# Patient Record
Sex: MALE | NOT HISPANIC OR LATINO | Employment: FULL TIME | URBAN - METROPOLITAN AREA
[De-identification: names, ages, dates, MRNs, and addresses within clinical notes are randomized per-mention and may not be internally consistent; named-entity substitution may affect disease eponyms.]

---

## 2021-01-20 ENCOUNTER — OFFICE VISIT (OUTPATIENT)
Dept: FAMILY MEDICINE CLINIC | Facility: CLINIC | Age: 31
End: 2021-01-20

## 2021-01-20 VITALS
SYSTOLIC BLOOD PRESSURE: 118 MMHG | RESPIRATION RATE: 16 BRPM | BODY MASS INDEX: 31.89 KG/M2 | HEIGHT: 67 IN | OXYGEN SATURATION: 97 % | WEIGHT: 203.2 LBS | DIASTOLIC BLOOD PRESSURE: 70 MMHG | HEART RATE: 72 BPM | TEMPERATURE: 98 F

## 2021-01-20 DIAGNOSIS — Z00.00 ANNUAL PHYSICAL EXAM: Primary | ICD-10-CM

## 2021-01-20 LAB
ALBUMIN SERPL-MCNC: 5 G/DL (ref 3.5–5.2)
ALBUMIN/GLOB SERPL: 2 G/DL
ALP SERPL-CCNC: 87 U/L (ref 39–117)
ALT SERPL W P-5'-P-CCNC: 47 U/L (ref 1–41)
ANION GAP SERPL CALCULATED.3IONS-SCNC: 11.1 MMOL/L (ref 5–15)
AST SERPL-CCNC: 22 U/L (ref 1–40)
BILIRUB SERPL-MCNC: 0.6 MG/DL (ref 0–1.2)
BUN SERPL-MCNC: 13 MG/DL (ref 6–20)
BUN/CREAT SERPL: 14.1 (ref 7–25)
CALCIUM SPEC-SCNC: 9.7 MG/DL (ref 8.6–10.5)
CHLORIDE SERPL-SCNC: 99 MMOL/L (ref 98–107)
CHOLEST SERPL-MCNC: 182 MG/DL (ref 0–200)
CO2 SERPL-SCNC: 27.9 MMOL/L (ref 22–29)
CREAT SERPL-MCNC: 0.92 MG/DL (ref 0.76–1.27)
GFR SERPL CREATININE-BSD FRML MDRD: 116 ML/MIN/1.73
GFR SERPL CREATININE-BSD FRML MDRD: 95 ML/MIN/1.73
GLOBULIN UR ELPH-MCNC: 2.5 GM/DL
GLUCOSE SERPL-MCNC: 71 MG/DL (ref 65–99)
HCV AB SER DONR QL: NORMAL
HDLC SERPL-MCNC: 37 MG/DL (ref 40–60)
LDLC SERPL CALC-MCNC: 115 MG/DL (ref 0–100)
LDLC/HDLC SERPL: 3.01 {RATIO}
POTASSIUM SERPL-SCNC: 4.3 MMOL/L (ref 3.5–5.2)
PROT SERPL-MCNC: 7.5 G/DL (ref 6–8.5)
SODIUM SERPL-SCNC: 138 MMOL/L (ref 136–145)
TRIGL SERPL-MCNC: 169 MG/DL (ref 0–150)
VLDLC SERPL-MCNC: 30 MG/DL (ref 5–40)

## 2021-01-20 PROCEDURE — 36415 COLL VENOUS BLD VENIPUNCTURE: CPT | Performed by: FAMILY MEDICINE

## 2021-01-20 PROCEDURE — 80053 COMPREHEN METABOLIC PANEL: CPT | Performed by: FAMILY MEDICINE

## 2021-01-20 PROCEDURE — 80061 LIPID PANEL: CPT | Performed by: FAMILY MEDICINE

## 2021-01-20 PROCEDURE — 86803 HEPATITIS C AB TEST: CPT | Performed by: FAMILY MEDICINE

## 2021-01-20 PROCEDURE — 99395 PREV VISIT EST AGE 18-39: CPT | Performed by: FAMILY MEDICINE

## 2021-01-20 NOTE — PROGRESS NOTES
New Patient Office Visit      Patient Name: Pato Carreon  : 1990   MRN: [id number]     Chief Complaint:    Chief Complaint   Patient presents with   • Establish Care   • Annual Exam       History of Present Illness: Pato Carreon is a 30 y.o. male who is here today to establish care.  Patient presents for annual exam today.  Patient defers a flu vaccine wants to check with his mother regarding his last tetanus shot.  Patient will call if he needs a tetanus shot.  Patient has some obesity and was counseled regarding exercise and diet recommendations.  Patient was offered screening labs today due to his obesity we will screen for hepatitis C as well.    Physical exam: Patient had normal heart and lung exam.  Patient's mood and affect were appropriate.  Patient had equal strength on bilateral hip flexion and 2+ knee reflexes bilaterally.    Subjective          Past Medical History: History reviewed. No pertinent past medical history.    Past Surgical History: History reviewed. No pertinent surgical history.    Family History:   Family History   Problem Relation Age of Onset   • No Known Problems Mother    • No Known Problems Father    • No Known Problems Sister    • No Known Problems Brother    • No Known Problems Brother    • No Known Problems Brother        Social History:   Social History     Socioeconomic History   • Marital status:      Spouse name: Not on file   • Number of children: Not on file   • Years of education: Not on file   • Highest education level: Not on file   Tobacco Use   • Smoking status: Never Smoker   • Smokeless tobacco: Never Used   Substance and Sexual Activity   • Alcohol use: No   • Drug use: Never   • Sexual activity: Defer       Medications:   No current outpatient medications on file.    Allergies:   No Known Allergies    Objective     Physical Exam:  Vital Signs:   Vitals:    21 1150   BP: 118/70   BP Location: Right arm   Patient Position: Sitting   Cuff  "Size: Adult   Pulse: 72   Resp: 16   Temp: 98 °F (36.7 °C)   TempSrc: Temporal   SpO2: 97%   Weight: 92.2 kg (203 lb 3.2 oz)   Height: 170.2 cm (67\")   PainSc: 0-No pain     Body mass index is 31.83 kg/m².       Assessment / Plan      Assessment/Plan:   Diagnoses and all orders for this visit:    1. Annual physical exam (Primary)  -     Lipid Panel  -     Hepatitis C Antibody  -     Comprehensive Metabolic Panel         1. I counseled patient regarding exercise recommendations including having 30 minutes of exercise daily 5 days a week and having 2 days a week of weightlifting training.  Also recommended diet with whole grains and vegetables.  Recommend to maintain a healthy weight with a BMI below 26.  I recommend yearly flu shots and regularly scheduled interval tetanus shots.  Patient deferred flu shot today.    Follow-up as needed.  If patient needs a tetanus shot he can come in to get it without an appointment.  Patient will call regarding this.      Follow Up:   Return if symptoms worsen or fail to improve.    Zbigniew Cleary DO  [facility] [place]       Please note that portions of this note may have been completed with a voice recognition program. Efforts were made to edit the dictations, but occasionally words are mistranscribed.   "

## 2021-07-13 ENCOUNTER — TELEPHONE (OUTPATIENT)
Dept: FAMILY MEDICINE CLINIC | Facility: CLINIC | Age: 31
End: 2021-07-13

## 2021-07-13 NOTE — TELEPHONE ENCOUNTER
Caller: Pato Carreon    Relationship: Self    Best call back number: [phone]     What is the medical concern/diagnosis: SCRATCHY THROAT AND SMALL COUGH    What specialty or service is being requested: ALLERY SPECIALIST    What is the provider, practice or medical service name: [facility]    What is the office location: [address] [place] [address]    What is the office phone number: [phone]    Any additional details:

## 2021-07-16 ENCOUNTER — OFFICE VISIT (OUTPATIENT)
Dept: FAMILY MEDICINE CLINIC | Facility: CLINIC | Age: 31
End: 2021-07-16

## 2021-07-16 VITALS
RESPIRATION RATE: 18 BRPM | BODY MASS INDEX: 30.01 KG/M2 | HEIGHT: 68 IN | HEART RATE: 75 BPM | DIASTOLIC BLOOD PRESSURE: 72 MMHG | WEIGHT: 198 LBS | SYSTOLIC BLOOD PRESSURE: 108 MMHG | TEMPERATURE: 98.6 F

## 2021-07-16 DIAGNOSIS — J30.2 SEASONAL ALLERGIES: Primary | ICD-10-CM

## 2021-07-16 PROCEDURE — 99213 OFFICE O/P EST LOW 20 MIN: CPT | Performed by: NURSE PRACTITIONER

## 2021-07-16 RX ORDER — LORATADINE 10 MG/1
10 TABLET ORAL DAILY
Qty: 90 TABLET | Refills: 0 | Status: SHIPPED | OUTPATIENT
Start: 2021-07-16 | End: 2021-10-14

## 2021-07-16 RX ORDER — FLUTICASONE PROPIONATE 50 MCG
2 SPRAY, SUSPENSION (ML) NASAL DAILY
Qty: 16 G | Refills: 0 | Status: SHIPPED | OUTPATIENT
Start: 2021-07-16 | End: 2021-08-15

## 2021-07-16 NOTE — PROGRESS NOTES
"  New Patient Office Visit      Patient Name: Pato Carreon  : 1990   MRN: [id number]   Care Team: Patient Care Team:  Cassy Mojica APRN as PCP - General (Nurse Practitioner)    Chief Complaint:    Chief Complaint   Patient presents with   • Allergies   • Cough       Patient presents to the office with complaints of dry cough and scratchy throat occurring daily for the last several months.  Denies shortness of breath, sore throat, fever or chills.  Admits seasonal allergies that includes symptoms of nasal congestion, sneeze, throat irritation and dry cough.  He had recent referral submitted to allergy specialist and is awaiting appointment info.  Currently he is not taking any medications.  States he uses Mucinex as needed with nasal congestion and this provides some relief.  Denies indigestion or acid reflux.  Denies worsening cough with laying flat or at bedtime.  Denies any history of asthma.    Allergies  Associated symptoms include coughing.   Cough        Subjective      Review of Systems:   Review of Systems   Respiratory: Positive for cough.        I have reviewed and the following portions of the patient's history were updated as appropriate: past family history, past medical history, past social history, past surgical history and problem list.    Medications:     Current Outpatient Medications:   •  fluticasone (Flonase) 50 MCG/ACT nasal spray, 2 sprays into the nostril(s) as directed by provider Daily for 30 days. After 2 weeks decrease to one spray per nostril daily, Disp: 16 g, Rfl: 0  •  loratadine (Claritin) 10 MG tablet, Take 1 tablet by mouth Daily for 90 days., Disp: 90 tablet, Rfl: 0    Allergies:   No Known Allergies    Objective     Physical Exam  Vital Signs:   Vitals:    21 0827   BP: 108/72   Pulse: 75   Resp: 18   Temp: 98.6 °F (37 °C)   TempSrc: Temporal   Weight: 89.8 kg (198 lb)   Height: 172.7 cm (68\")   PainSc: 0-No pain     Body mass index is 30.11 kg/m². "     Physical Exam  Vitals and nursing note reviewed.   Constitutional:       Appearance: Normal appearance.   HENT:      Head: Normocephalic and atraumatic.      Right Ear: Hearing, ear canal and external ear normal. No drainage. No middle ear effusion. Tympanic membrane is bulging.      Left Ear: Hearing, ear canal and external ear normal. No drainage.  No middle ear effusion. Tympanic membrane is bulging.      Nose: Nose normal.      Mouth/Throat:      Mouth: Mucous membranes are moist.      Pharynx: Oropharynx is clear.   Cardiovascular:      Rate and Rhythm: Normal rate and regular rhythm.      Heart sounds: Normal heart sounds.   Pulmonary:      Effort: Pulmonary effort is normal.      Breath sounds: Normal breath sounds.   Musculoskeletal:      Cervical back: Normal range of motion. No tenderness.   Skin:     General: Skin is warm and dry.   Neurological:      General: No focal deficit present.      Mental Status: He is alert and oriented to person, place, and time.   Psychiatric:         Mood and Affect: Mood normal.         Behavior: Behavior normal.         Thought Content: Thought content normal.         Judgment: Judgment normal.         Assessment / Plan      Assessment/Plan  Problems Addressed This Visit  Diagnoses and all orders for this visit:    1. Seasonal allergies (Primary)  Assessment & Plan:  Take medications as directed.    Drink plenty of water.    Awaiting referral to allergist.    Try to avoid any known allergy triggers.   Return to office or call as needed    Orders:  -     loratadine (Claritin) 10 MG tablet; Take 1 tablet by mouth Daily for 90 days.  Dispense: 90 tablet; Refill: 0  -     fluticasone (Flonase) 50 MCG/ACT nasal spray; 2 sprays into the nostril(s) as directed by provider Daily for 30 days. After 2 weeks decrease to one spray per nostril daily  Dispense: 16 g; Refill: 0      Plan of care reviewed with patient at the conclusion of today's visit. Education was provided regarding  diagnosis and management.  Patient verbalizes understanding of and agreement with management plan.    Follow Up: Return if symptoms worsen or fail to improve.        WILLIAM Peter  [facility] [place] [place]

## 2021-07-16 NOTE — PATIENT INSTRUCTIONS
Allergic Rhinitis, Adult  Allergic rhinitis is a reaction to allergens. Allergens are things that can cause an allergic reaction. This condition affects the lining inside the nose (mucous membrane).  There are two types of allergic rhinitis:  · Seasonal. This type is also called hay fever. It happens only during some times of the year.  · Perennial. This type can happen at any time of the year.  This condition cannot be spread from person to person (is not contagious). It can be mild, worse, or very bad. It can develop at any age and may be outgrown.  What are the causes?  This condition may be caused by:  · Pollen from grasses, trees, and weeds.  · Dust mites.  · Smoke.  · Mold.  · Car fumes.  · The pee (urine), spit, or dander of pets. Dander is dead skin cells from a pet.  What increases the risk?  You are more likely to develop this condition if:  · You have allergies in your family.  · You have problems like allergies in your family. You may have:  ? Swelling of parts of your eyes and eyelids.  ? Asthma. This affects how you breathe.  ? Long-term redness and swelling on your skin.  ? Food allergies.  What are the signs or symptoms?  The main symptom of this condition is a runny or stuffy nose (nasal congestion). Other symptoms may include:  · Sneezing or coughing.  · Itching and tearing of your eyes.  · Mucus that drips down the back of your throat (postnasal drip).  · Trouble sleeping.  · Feeling tired.  · Headache.  · Sore throat.  How is this treated?  There is no cure for this condition. You should avoid things that you are allergic to. Treatment can help to relieve symptoms. This may include:  · Medicines that block allergy symptoms, such as corticosteroids or antihistamines. These may be given as a shot, nasal spray, or pill.  · Avoiding things you are allergic to.  · Medicines that give you bits of what you are allergic to over time. This is called immunotherapy. It is done if other treatments do not  help. You may get:  ? Shots.  ? Medicine under your tongue.  · Stronger medicines, if other treatments do not help.  Follow these instructions at home:  Avoiding allergens  Find out what things you are allergic to and avoid them. To do this, try these things:  · If you get allergies any time of year:  ? Replace carpet with wood, tile, or vinyl cmaeron. Carpet can trap pet dander and dust.  ? Do not smoke. Do not allow smoking in your home.  ? Change your heating and air conditioning filters at least once a month.  · If you get allergies only some times of the year:  ? Keep windows closed when you can.  ? Plan things to do outside when pollen counts are lowest. Check pollen counts before you plan things to do outside.  ? When you come indoors, change your clothes and shower before you sit on furniture or bedding.  · If you are allergic to a pet:  ? Keep the pet out of your bedroom.  ? Vacuum, sweep, and dust often.    General instructions  · Take over-the-counter and prescription medicines only as told by your doctor.  · Drink enough fluid to keep your pee (urine) pale yellow.  · Keep all follow-up visits as told by your doctor. This is important.  Where to find more information  · American Academy of Allergy, Asthma & Immunology: www.aaaai.org  Contact a doctor if:  · You have a fever.  · You get a cough that does not go away.  · You make whistling sounds when you breathe (wheeze).  · Your symptoms slow you down.  · Your symptoms stop you from doing your normal things each day.  Get help right away if:  · You are short of breath.  This symptom may be an emergency. Do not wait to see if the symptom will go away. Get medical help right away. Call your local emergency services (911 in the [place] Do not drive yourself to the hospital.  Summary  · Allergic rhinitis may be treated by taking medicines and avoiding things you are allergic to.  · If you have allergies only some of the year, keep windows closed when you can  at those times.  · Contact your doctor if you get a fever or a cough that does not go away.  This information is not intended to replace advice given to you by your health care provider. Make sure you discuss any questions you have with your health care provider.  Document Revised: 02/08/2021 Document Reviewed: 12/15/2020  [employer] Patient Education © 2021 [employer]    Cough, Adult  A cough helps to clear your throat and lungs. A cough may be a sign of an illness or another medical condition.  An acute cough may only last 2-3 weeks, while a chronic cough may last 8 or more weeks.  Many things can cause a cough. They include:  · Germs (viruses or bacteria) that attack the airway.  · Breathing in things that bother (irritate) your lungs.  · Allergies.  · Asthma.  · Mucus that runs down the back of your throat (postnasal drip).  · Smoking.  · Acid backing up from the stomach into the tube that moves food from the mouth to the stomach (gastroesophageal reflux).  · Some medicines.  · Lung problems.  · Other medical conditions, such as heart failure or a blood clot in the lung (pulmonary embolism).  Follow these instructions at home:  Medicines  · Take over-the-counter and prescription medicines only as told by your doctor.  · Talk with your doctor before you take medicines that stop a cough (cough suppressants).  Lifestyle    · Do not smoke, and try not to be around smoke. Do not use any products that contain nicotine or tobacco, such as cigarettes, e-cigarettes, and chewing tobacco. If you need help quitting, ask your doctor.  · Drink enough fluid to keep your pee (urine) pale yellow.  · Avoid caffeine.  · Do not drink alcohol if your doctor tells you not to drink.  General instructions    · Watch for any changes in your cough. Tell your doctor about them.  · Always cover your mouth when you cough.  · Stay away from things that make you cough, such as perfume, candles, campfire smoke, or cleaning products.  · If the  air is dry, use a cool mist vaporizer or humidifier in your home.  · If your cough is worse at night, try using extra pillows to raise your head up higher while you sleep.  · Rest as needed.  · Keep all follow-up visits as told by your doctor. This is important.  Contact a doctor if:  · You have new symptoms.  · You cough up pus.  · Your cough does not get better after 2-3 weeks, or your cough gets worse.  · Cough medicine does not help your cough and you are not sleeping well.  · You have pain that gets worse or pain that is not helped with medicine.  · You have a fever.  · You are losing weight and you do not know why.  · You have night sweats.  Get help right away if:  · You cough up blood.  · You have trouble breathing.  · Your heartbeat is very fast.  These symptoms may be an emergency. Do not wait to see if the symptoms will go away. Get medical help right away. Call your local emergency services (911 in the [place] Do not drive yourself to the hospital.  Summary  · A cough helps to clear your throat and lungs. Many things can cause a cough.  · Take over-the-counter and prescription medicines only as told by your doctor.  · Always cover your mouth when you cough.  · Contact a doctor if you have new symptoms or you have a cough that does not get better or gets worse.  This information is not intended to replace advice given to you by your health care provider. Make sure you discuss any questions you have with your health care provider.  Document Revised: 02/05/2021 Document Reviewed: 01/06/2020  [employer] Patient Education © 2021 [employer]

## 2021-07-16 NOTE — ASSESSMENT & PLAN NOTE
Take medications as directed.    Drink plenty of water.    Awaiting referral to allergist.    Try to avoid any known allergy triggers.   Return to office or call as needed

## 2022-12-23 ENCOUNTER — OFFICE VISIT (OUTPATIENT)
Dept: INTERNAL MEDICINE | Facility: CLINIC | Age: 32
End: 2022-12-23

## 2022-12-23 ENCOUNTER — LAB (OUTPATIENT)
Dept: LAB | Facility: HOSPITAL | Age: 32
End: 2022-12-23

## 2022-12-23 VITALS
HEIGHT: 67 IN | WEIGHT: 203 LBS | OXYGEN SATURATION: 98 % | TEMPERATURE: 97.8 F | SYSTOLIC BLOOD PRESSURE: 110 MMHG | DIASTOLIC BLOOD PRESSURE: 76 MMHG | BODY MASS INDEX: 31.86 KG/M2 | HEART RATE: 74 BPM

## 2022-12-23 DIAGNOSIS — Z00.00 WELL ADULT EXAM: Primary | ICD-10-CM

## 2022-12-23 DIAGNOSIS — R14.0 ABDOMINAL BLOATING: ICD-10-CM

## 2022-12-23 DIAGNOSIS — E66.01 MORBID (SEVERE) OBESITY DUE TO EXCESS CALORIES: ICD-10-CM

## 2022-12-23 DIAGNOSIS — Z00.00 WELL ADULT EXAM: ICD-10-CM

## 2022-12-23 PROCEDURE — 80053 COMPREHEN METABOLIC PANEL: CPT

## 2022-12-23 PROCEDURE — 80061 LIPID PANEL: CPT

## 2022-12-23 PROCEDURE — 83013 H PYLORI (C-13) BREATH: CPT

## 2022-12-23 PROCEDURE — 85025 COMPLETE CBC W/AUTO DIFF WBC: CPT

## 2022-12-23 PROCEDURE — 36415 COLL VENOUS BLD VENIPUNCTURE: CPT

## 2022-12-23 PROCEDURE — 83036 HEMOGLOBIN GLYCOSYLATED A1C: CPT

## 2022-12-23 PROCEDURE — 99395 PREV VISIT EST AGE 18-39: CPT | Performed by: STUDENT IN AN ORGANIZED HEALTH CARE EDUCATION/TRAINING PROGRAM

## 2022-12-23 RX ORDER — FAMOTIDINE 20 MG/1
20 TABLET, FILM COATED ORAL 2 TIMES DAILY
Qty: 30 TABLET | Refills: 1 | Status: SHIPPED | OUTPATIENT
Start: 2022-12-23 | End: 2022-12-27

## 2022-12-23 NOTE — PROGRESS NOTES
Office Note     Name: Pato Carreon    : 1990     MRN: [id number]     Chief Complaint  Annual Exam (Establish care )    Subjective     History of Present Illness:  Pato Carreon is a 32 y.o. male who presents today for     Pato Carreon is a 30 y.o. male who is here today to establish care.  Patient presents for annual exam today. \ Patient will call if he needs a tetanus shot.    Bloated   Intermittent abdominal comfort after meals.   Normal bowel movement  Aunt had H.Pylor,   Brother had H.pylori        Social hx  Pulido Eletric    :  No kids        Review of Systems:   Review of Systems   All other systems reviewed and are negative.      Past Medical History: History reviewed. No pertinent past medical history.    Past Surgical History: History reviewed. No pertinent surgical history.    Family History:   Family History   Problem Relation Age of Onset   • No Known Problems Mother    • No Known Problems Father    • No Known Problems Sister    • No Known Problems Brother    • No Known Problems Brother    • No Known Problems Brother        Social History:   Social History     Socioeconomic History   • Marital status:    Tobacco Use   • Smoking status: Never   • Smokeless tobacco: Never   Vaping Use   • Vaping Use: Never used   Substance and Sexual Activity   • Alcohol use: No   • Drug use: Never   • Sexual activity: Yes     Partners: Female       Immunizations:   Immunization History   Administered Date(s) Administered   • IPV 2000        Medications:     Current Outpatient Medications:   •  famotidine (Pepcid) 20 MG tablet, Take 1 tablet by mouth 2 (Two) Times a Day., Disp: 30 tablet, Rfl: 1  •  fluticasone (Flonase) 50 MCG/ACT nasal spray, 2 sprays into the nostril(s) as directed by provider Daily for 30 days. After 2 weeks decrease to one spray per nostril daily, Disp: 16 g, Rfl: 0  •  loratadine (Claritin) 10 MG tablet, Take 1 tablet by mouth Daily for 90 days., Disp: 90  "tablet, Rfl: 0    Allergies:   No Known Allergies    Objective     Vital Signs  /76   Pulse 74   Temp 97.8 °F (36.6 °C) (Temporal)   Ht 169.5 cm (66.75\")   Wt 92.1 kg (203 lb)   SpO2 98%   BMI 32.03 kg/m²   Estimated body mass index is 32.03 kg/m² as calculated from the following:    Height as of this encounter: 169.5 cm (66.75\").    Weight as of this encounter: 92.1 kg (203 lb).    BMI is >= 30 and <35. (Class 1 Obesity). The following options were offered after discussion;: weight loss educational material (shared in after visit summary), exercise counseling/recommendations and nutrition counseling/recommendations      Physical Exam  Constitutional:       Appearance: Normal appearance. He is normal weight.   Cardiovascular:      Rate and Rhythm: Normal rate and regular rhythm.      Pulses: Normal pulses.      Heart sounds: Normal heart sounds.   Pulmonary:      Effort: Pulmonary effort is normal.      Breath sounds: Normal breath sounds.   Abdominal:      General: Abdomen is flat.      Palpations: Abdomen is soft.   Skin:     General: Skin is warm.   Neurological:      General: No focal deficit present.      Mental Status: He is alert and oriented to person, place, and time.   Psychiatric:         Mood and Affect: Mood normal.         Behavior: Behavior normal.          Result Review :                  Assessment and Plan     1. Well adult exam    - Comprehensive metabolic panel; Future  - Hemoglobin A1c; Future  - Lipid panel; Future  - CBC w AUTO Differential; Future    2. Morbid (severe) obesity due to excess calories (HCC)    Discussed risk associated with obesity. he was given instructions on calorie counting as well as on decreasing carbohydrate intake. he was also encouraged to start exercise regimen.  Instructed patient to download fitness esteban on his smart phone to aid in calorie counting and exercise tracking.      3. Abdominal bloating  Avoid dietary triggers  - H. Pylori Breath Test - " Breath, Lung; Future       Follow Up  Return in about 1 year (around 12/23/2023) for annual physical, Annual.    Antony Lara MD  [facility] [address] [address]  [facility]  [address]  [address] [address] [phone]

## 2022-12-24 LAB
ALBUMIN SERPL-MCNC: 4.5 G/DL (ref 3.5–5.2)
ALBUMIN/GLOB SERPL: 1.7 G/DL
ALP SERPL-CCNC: 84 U/L (ref 39–117)
ALT SERPL W P-5'-P-CCNC: 35 U/L (ref 1–41)
ANION GAP SERPL CALCULATED.3IONS-SCNC: 10.2 MMOL/L (ref 5–15)
AST SERPL-CCNC: 21 U/L (ref 1–40)
BASOPHILS # BLD AUTO: 0.03 10*3/MM3 (ref 0–0.2)
BASOPHILS NFR BLD AUTO: 0.5 % (ref 0–1.5)
BILIRUB SERPL-MCNC: 0.6 MG/DL (ref 0–1.2)
BUN SERPL-MCNC: 12 MG/DL (ref 6–20)
BUN/CREAT SERPL: 12.4 (ref 7–25)
CALCIUM SPEC-SCNC: 9.4 MG/DL (ref 8.6–10.5)
CHLORIDE SERPL-SCNC: 102 MMOL/L (ref 98–107)
CHOLEST SERPL-MCNC: 218 MG/DL (ref 0–200)
CO2 SERPL-SCNC: 26.8 MMOL/L (ref 22–29)
CREAT SERPL-MCNC: 0.97 MG/DL (ref 0.76–1.27)
DEPRECATED RDW RBC AUTO: 39.7 FL (ref 37–54)
EGFRCR SERPLBLD CKD-EPI 2021: 105.1 ML/MIN/1.73
EOSINOPHIL # BLD AUTO: 0.03 10*3/MM3 (ref 0–0.4)
EOSINOPHIL NFR BLD AUTO: 0.5 % (ref 0.3–6.2)
ERYTHROCYTE [DISTWIDTH] IN BLOOD BY AUTOMATED COUNT: 12.4 % (ref 12.3–15.4)
GLOBULIN UR ELPH-MCNC: 2.7 GM/DL
GLUCOSE SERPL-MCNC: 82 MG/DL (ref 65–99)
HBA1C MFR BLD: 5.2 % (ref 4.8–5.6)
HCT VFR BLD AUTO: 49.1 % (ref 37.5–51)
HDLC SERPL-MCNC: 40 MG/DL (ref 40–60)
HGB BLD-MCNC: 16.6 G/DL (ref 13–17.7)
IMM GRANULOCYTES # BLD AUTO: 0.01 10*3/MM3 (ref 0–0.05)
IMM GRANULOCYTES NFR BLD AUTO: 0.2 % (ref 0–0.5)
LDLC SERPL CALC-MCNC: 154 MG/DL (ref 0–100)
LDLC/HDLC SERPL: 3.8 {RATIO}
LYMPHOCYTES # BLD AUTO: 1.39 10*3/MM3 (ref 0.7–3.1)
LYMPHOCYTES NFR BLD AUTO: 24.3 % (ref 19.6–45.3)
MCH RBC QN AUTO: 29.5 PG (ref 26.6–33)
MCHC RBC AUTO-ENTMCNC: 33.8 G/DL (ref 31.5–35.7)
MCV RBC AUTO: 87.4 FL (ref 79–97)
MONOCYTES # BLD AUTO: 0.45 10*3/MM3 (ref 0.1–0.9)
MONOCYTES NFR BLD AUTO: 7.9 % (ref 5–12)
NEUTROPHILS NFR BLD AUTO: 3.8 10*3/MM3 (ref 1.7–7)
NEUTROPHILS NFR BLD AUTO: 66.6 % (ref 42.7–76)
NRBC BLD AUTO-RTO: 0 /100 WBC (ref 0–0.2)
PLATELET # BLD AUTO: 274 10*3/MM3 (ref 140–450)
PMV BLD AUTO: 10.1 FL (ref 6–12)
POTASSIUM SERPL-SCNC: 4.2 MMOL/L (ref 3.5–5.2)
PROT SERPL-MCNC: 7.2 G/DL (ref 6–8.5)
RBC # BLD AUTO: 5.62 10*6/MM3 (ref 4.14–5.8)
SODIUM SERPL-SCNC: 139 MMOL/L (ref 136–145)
TRIGL SERPL-MCNC: 130 MG/DL (ref 0–150)
VLDLC SERPL-MCNC: 24 MG/DL (ref 5–40)
WBC NRBC COR # BLD: 5.71 10*3/MM3 (ref 3.4–10.8)

## 2022-12-27 DIAGNOSIS — A04.8 BACTERIAL INFECTION DUE TO H. PYLORI: Primary | ICD-10-CM

## 2022-12-27 LAB — UREA BREATH TEST QL: POSITIVE

## 2022-12-27 RX ORDER — METRONIDAZOLE 500 MG/1
500 TABLET ORAL 4 TIMES DAILY
Qty: 56 TABLET | Refills: 0 | Status: SHIPPED | OUTPATIENT
Start: 2022-12-27 | End: 2023-01-10

## 2022-12-27 RX ORDER — OMEPRAZOLE 20 MG/1
20 CAPSULE, DELAYED RELEASE ORAL DAILY
Qty: 14 CAPSULE | Refills: 0 | Status: SHIPPED | OUTPATIENT
Start: 2022-12-27 | End: 2023-01-10

## 2022-12-27 RX ORDER — TETRACYCLINE HYDROCHLORIDE 500 MG/1
500 CAPSULE ORAL 4 TIMES DAILY
Qty: 56 CAPSULE | Refills: 0 | Status: SHIPPED | OUTPATIENT
Start: 2022-12-27 | End: 2023-01-10

## 2022-12-28 ENCOUNTER — TELEPHONE (OUTPATIENT)
Dept: INTERNAL MEDICINE | Facility: CLINIC | Age: 32
End: 2022-12-28

## 2022-12-28 NOTE — TELEPHONE ENCOUNTER
----- Message from Antony Lara MD sent at 12/27/2022  4:22 PM EST -----  H.pylori test was positive which could be the reason for his abdominal discomfort after meals. I prescribed a 2 week treatment of four medications that have been sent to his pharmacy.     His lipid panel showed high LDL cholesterol. Thre is no need to start medicine. Right now he can focus on his diet, weight and physical exercise. I advised to begin progressive daily aerobic exercise program, follow a low fat, low cholesterol diet, attempt to lose weight, and reduce salt in diet and cooking.    Please have the patient schedule a follow up in 6 weeks.

## 2022-12-28 NOTE — TELEPHONE ENCOUNTER
OK FOR HUB TO RELAY MESSSAGE AND SCHEDULE AN APPT      Called pt to inform of lab results, vm full could not leave a message     ----- Message from Antony Lara MD sent at 12/27/2022  4:22 PM EST -----  H.pylori test was positive which could be the reason for his abdominal discomfort after meals. I prescribed a 2 week treatment of four medications that have been sent to his pharmacy.      His lipid panel showed high LDL cholesterol. Thre is no need to start medicine. Right now he can focus on his diet, weight and physical exercise. I advised to begin progressive daily aerobic exercise program, follow a low fat, low cholesterol diet, attempt to lose weight, and reduce salt in diet and cooking.     Please have the patient schedule a follow up in 6 weeks.